# Patient Record
Sex: FEMALE | Race: WHITE | Employment: FULL TIME | URBAN - METROPOLITAN AREA
[De-identification: names, ages, dates, MRNs, and addresses within clinical notes are randomized per-mention and may not be internally consistent; named-entity substitution may affect disease eponyms.]

---

## 2017-02-01 RX ORDER — ATORVASTATIN CALCIUM 40 MG/1
40 TABLET, FILM COATED ORAL DAILY
Qty: 90 TABLET | Refills: 3 | Status: SHIPPED | OUTPATIENT
Start: 2017-02-01 | End: 2018-04-18 | Stop reason: SDUPTHER

## 2017-03-31 ENCOUNTER — OFFICE VISIT (OUTPATIENT)
Dept: CARDIOLOGY CLINIC | Age: 77
End: 2017-03-31

## 2017-03-31 VITALS
SYSTOLIC BLOOD PRESSURE: 150 MMHG | OXYGEN SATURATION: 99 % | WEIGHT: 145 LBS | HEIGHT: 63 IN | DIASTOLIC BLOOD PRESSURE: 60 MMHG | HEART RATE: 73 BPM | BODY MASS INDEX: 25.69 KG/M2

## 2017-03-31 DIAGNOSIS — I48.0 PAROXYSMAL ATRIAL FIBRILLATION (HCC): ICD-10-CM

## 2017-03-31 DIAGNOSIS — E78.00 PURE HYPERCHOLESTEROLEMIA: ICD-10-CM

## 2017-03-31 DIAGNOSIS — I10 ESSENTIAL HYPERTENSION: ICD-10-CM

## 2017-03-31 DIAGNOSIS — Z95.1 S/P CABG X 4: ICD-10-CM

## 2017-03-31 DIAGNOSIS — I25.810 CORONARY ARTERY DISEASE INVOLVING OTHER CORONARY ARTERY BYPASS GRAFT WITHOUT ANGINA PECTORIS: Primary | ICD-10-CM

## 2017-03-31 DIAGNOSIS — I35.0 NONRHEUMATIC AORTIC VALVE STENOSIS: ICD-10-CM

## 2017-03-31 DIAGNOSIS — Z95.2 S/P AVR: ICD-10-CM

## 2017-03-31 PROCEDURE — 99214 OFFICE O/P EST MOD 30 MIN: CPT | Performed by: INTERNAL MEDICINE

## 2017-10-13 ENCOUNTER — OFFICE VISIT (OUTPATIENT)
Dept: CARDIOLOGY CLINIC | Age: 77
End: 2017-10-13

## 2017-10-13 VITALS
HEART RATE: 62 BPM | HEIGHT: 63 IN | SYSTOLIC BLOOD PRESSURE: 106 MMHG | OXYGEN SATURATION: 98 % | WEIGHT: 138.4 LBS | BODY MASS INDEX: 24.52 KG/M2 | DIASTOLIC BLOOD PRESSURE: 58 MMHG

## 2017-10-13 DIAGNOSIS — Z95.1 S/P CABG X 4: Chronic | ICD-10-CM

## 2017-10-13 DIAGNOSIS — E78.00 PURE HYPERCHOLESTEROLEMIA: ICD-10-CM

## 2017-10-13 DIAGNOSIS — I10 ESSENTIAL HYPERTENSION: Chronic | ICD-10-CM

## 2017-10-13 DIAGNOSIS — Z95.2 S/P AVR: Chronic | ICD-10-CM

## 2017-10-13 DIAGNOSIS — I25.10 CORONARY ARTERY DISEASE INVOLVING NATIVE CORONARY ARTERY OF NATIVE HEART WITHOUT ANGINA PECTORIS: Primary | Chronic | ICD-10-CM

## 2017-10-13 PROCEDURE — 99214 OFFICE O/P EST MOD 30 MIN: CPT | Performed by: INTERNAL MEDICINE

## 2017-10-13 NOTE — COMMUNICATION BODY
HPI:  The patient is 69 y.o. female with a past medical history significant for tonsillar cancer, CAD s/p CABG (2013), and AS s/p AVR (4/2016) presents for chronic management of her CAD. She has no specific cardiac complaints today. Her dyspnea on exertion has markedly improved after surgery and has not worsened since. She had been diagnosed with tonsillar cancer and has completed chemotherapy and radiation. She endorses generalized chronic fatigue but no changes. Her dysphagia is chronic but is her biggest complaint. She has lost 40 lbs secondary to her swallowing difficulty/cancer treatment. Patient denies exertional chest pain/pressure, dyspnea at rest, PND, orthopnea, palpitations, lightheadedness, changes in LE edema, and syncope. Patient reports compliance to her medications. Assessment/Plan:     1) CAD s/p CABG x4 in 8/2013. Asymptomatic. Continue with medical management and risk factor modification including aspirin 325 mg daily, statin, and B-blocker. 2) Aortic stenosis s/p AVR (pericardial tissue valve) 7/20/16. Continue ASA. 3) HLD. Continue Atorvastatin 40mg daily. 4) Essential hypertension. Stable and asymptomatic. Goal BP < 140/90 with DM. Continue medical therapy and monitor. Lisinopril was discontinued secondary to hypotension. 5) Post op atrial fibrillation. Currently in regular rhythm per physical examination. Denies palpitations. F/u in clinic in 6 months. Thank you very much for allowing me to participate in the care of your patient. Please do not hesitate to contact me if you have any questions. Sincerely,  Anyi Gutierrez MD  Assessment/Plan:     1) CAD s/p CABG x4 in 8/2013. Asymptomatic. Continue with medical management and risk factor modification including aspirin 325 mg daily, statin, and B-blocker. 2) Aortic stenosis s/p AVR (pericardial tissue valve) 7/20/16. Continue ASA. 3) HLD. Continue Atorvastatin 40mg daily. 4) Essential hypertension. Stable and asymptomatic. Goal BP < 140/90 with DM. Continue medical therapy and monitor. Lisinopril was discontinued secondary to hypotension. 5) Post op atrial fibrillation. Currently in regular rhythm per physical examination. Denies palpitations. F/u in clinic in 6 months. Thank you very much for allowing me to participate in the care of your patient. Please do not hesitate to contact me if you have any questions. Sincerely,  Kathy Reddy.  Faiza Wheat MD
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
## 2017-10-13 NOTE — LETTER
[address] Cardiology - [address] [address]  [address] [facility] [address]   [address] [address]  Phone: [phone]  Fax: [phone]    Ally Murray MD        October 13, 2017     Ian Olea, [address] [id number]    Patient: Vernon Marrero  MR Number: [id number]  YOB: 1940  Date of Visit: 10/13/2017    Dear Dr. Ian Olea:    HPI:  The patient is 69 y.o. female with a past medical history significant for tonsillar cancer, CAD s/p CABG (2013), and AS s/p AVR (4/2016) presents for chronic management of her CAD. She has no specific cardiac complaints today. Her dyspnea on exertion has markedly improved after surgery and has not worsened since. She had been diagnosed with tonsillar cancer and has completed chemotherapy and radiation. She endorses generalized chronic fatigue but no changes. Her dysphagia is chronic but is her biggest complaint. She has lost 40 lbs secondary to her swallowing difficulty/cancer treatment. Patient denies exertional chest pain/pressure, dyspnea at rest, PND, orthopnea, palpitations, lightheadedness, changes in LE edema, and syncope. Patient reports compliance to her medications. Assessment/Plan:     1) CAD s/p CABG x4 in 8/2013. Asymptomatic. Continue with medical management and risk factor modification including aspirin 325 mg daily, statin, and B-blocker. 2) Aortic stenosis s/p AVR (pericardial tissue valve) 7/20/16. Continue ASA. 3) HLD. Continue Atorvastatin 40mg daily. 4) Essential hypertension. Stable and asymptomatic. Goal BP < 140/90 with DM. Continue medical therapy and monitor. Lisinopril was discontinued secondary to hypotension. 5) Post op atrial fibrillation. Currently in regular rhythm per physical examination. Denies palpitations. F/u in clinic in 6 months.      Thank you very much for allowing me to participate in the care of your patient. Please do not hesitate to contact me if you have any questions. Sincerely,  Karlene Jerome. MD Matt      1) CAD s/p CABG x4 in 8/2013. Asymptomatic. Continue with medical management and risk factor modification including aspirin 325 mg daily, statin, and B-blocker. 2) Aortic stenosis s/p AVR (pericardial tissue valve) 7/20/16. Continue ASA. 3) HLD. Continue Atorvastatin 40mg daily. 4) Essential hypertension. Stable and asymptomatic. Goal BP < 140/90 with DM. Continue medical therapy and monitor. Lisinopril was discontinued secondary to hypotension. 5) Post op atrial fibrillation. Currently in regular rhythm per physical examination. Denies palpitations. F/u in clinic in 6 months. Thank you very much for allowing me to participate in the care of your patient. Please do not hesitate to contact me if you have any questions. Sincerely,  Karlene Jerome. Wilver Celaya MD    If you have questions, please do not hesitate to call me. I look forward to following Shirin Estrada along with you.     Sincerely,        Marylin Ruelas MD

## 2018-04-18 RX ORDER — ATORVASTATIN CALCIUM 40 MG/1
TABLET, FILM COATED ORAL
Qty: 90 TABLET | Refills: 3 | Status: SHIPPED | OUTPATIENT
Start: 2018-04-18 | End: 2018-05-11 | Stop reason: SDUPTHER

## 2018-05-11 ENCOUNTER — OFFICE VISIT (OUTPATIENT)
Dept: CARDIOLOGY CLINIC | Age: 78
End: 2018-05-11

## 2018-05-11 VITALS
OXYGEN SATURATION: 97 % | HEIGHT: 64 IN | DIASTOLIC BLOOD PRESSURE: 70 MMHG | SYSTOLIC BLOOD PRESSURE: 120 MMHG | BODY MASS INDEX: 23.93 KG/M2 | HEART RATE: 60 BPM | WEIGHT: 140.2 LBS

## 2018-05-11 DIAGNOSIS — Z95.2 S/P AVR: Chronic | ICD-10-CM

## 2018-05-11 DIAGNOSIS — E78.00 PURE HYPERCHOLESTEROLEMIA: ICD-10-CM

## 2018-05-11 DIAGNOSIS — I10 ESSENTIAL HYPERTENSION: Chronic | ICD-10-CM

## 2018-05-11 DIAGNOSIS — Z95.1 S/P CABG X 4: Chronic | ICD-10-CM

## 2018-05-11 DIAGNOSIS — I25.10 CORONARY ARTERY DISEASE INVOLVING NATIVE CORONARY ARTERY OF NATIVE HEART WITHOUT ANGINA PECTORIS: Primary | Chronic | ICD-10-CM

## 2018-05-11 PROCEDURE — 99214 OFFICE O/P EST MOD 30 MIN: CPT | Performed by: INTERNAL MEDICINE

## 2018-05-11 RX ORDER — MULTIVIT WITH MINERALS/LUTEIN
250 TABLET ORAL DAILY
COMMUNITY
End: 2020-01-17

## 2018-05-11 RX ORDER — LEVOTHYROXINE SODIUM 0.05 MG/1
50 TABLET ORAL DAILY
COMMUNITY

## 2018-05-11 RX ORDER — ATORVASTATIN CALCIUM 40 MG/1
TABLET, FILM COATED ORAL
Qty: 90 TABLET | Refills: 3 | Status: SHIPPED | OUTPATIENT
Start: 2018-05-11 | End: 2018-11-19 | Stop reason: SDUPTHER

## 2018-10-12 ENCOUNTER — TELEPHONE (OUTPATIENT)
Dept: CARDIOLOGY CLINIC | Age: 78
End: 2018-10-12
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
## 2018-11-16 NOTE — PROGRESS NOTES
the note above accurately reflects all work, treatment, procedures, and medical decision making performed by me.

## 2018-11-19 ENCOUNTER — OFFICE VISIT (OUTPATIENT)
Dept: CARDIOLOGY CLINIC | Age: 78
End: 2018-11-19
Payer: MEDICARE

## 2018-11-19 VITALS
HEIGHT: 63 IN | WEIGHT: 131 LBS | DIASTOLIC BLOOD PRESSURE: 76 MMHG | SYSTOLIC BLOOD PRESSURE: 118 MMHG | HEART RATE: 66 BPM | BODY MASS INDEX: 23.21 KG/M2 | OXYGEN SATURATION: 98 %

## 2018-11-19 DIAGNOSIS — I10 ESSENTIAL HYPERTENSION: Chronic | ICD-10-CM

## 2018-11-19 DIAGNOSIS — Z95.1 S/P CABG X 4: Chronic | ICD-10-CM

## 2018-11-19 DIAGNOSIS — E78.00 PURE HYPERCHOLESTEROLEMIA: ICD-10-CM

## 2018-11-19 DIAGNOSIS — Z95.2 S/P AVR: ICD-10-CM

## 2018-11-19 DIAGNOSIS — I25.10 CORONARY ARTERY DISEASE INVOLVING NATIVE CORONARY ARTERY OF NATIVE HEART WITHOUT ANGINA PECTORIS: Primary | Chronic | ICD-10-CM

## 2018-11-19 PROCEDURE — 99214 OFFICE O/P EST MOD 30 MIN: CPT | Performed by: INTERNAL MEDICINE

## 2018-11-19 RX ORDER — ATORVASTATIN CALCIUM 40 MG/1
TABLET, FILM COATED ORAL
Qty: 90 TABLET | Refills: 3 | Status: SHIPPED | OUTPATIENT
Start: 2018-11-19 | End: 2019-12-23

## 2018-11-19 NOTE — LETTER
This note was scribed in the presence of the physician by Donna Erazo RN. Physician Attestation: The scribes documentation has been prepared under my direction and personally reviewed by me in its entirety. I confirm that the note above accurately reflects all work, treatment, procedures, and medical decision making performed by me. If you have questions, please do not hesitate to call me. I look forward to following Frieda Brar along with you.     Sincerely,        Gurjit Whitaker MD

## 2019-12-23 RX ORDER — ATORVASTATIN CALCIUM 40 MG/1
TABLET, FILM COATED ORAL
Qty: 90 TABLET | Refills: 0 | Status: SHIPPED | OUTPATIENT
Start: 2019-12-23 | End: 2020-04-07 | Stop reason: SDUPTHER

## 2020-01-16 NOTE — PROGRESS NOTES
Carpal tunnel release 3 x on left ; 4 x on right side     HYSTERECTOMY      Partial-uterus removed    OTHER SURGICAL HISTORY Bilateral     Bilateral thumb tendon release     SPINE SURGERY  2014    c5 and c7 - bone donor/plating/screws    TONSILLECTOMY      as a child      Family History   Problem Relation Age of Onset    Alzheimer's Disease Mother     Diabetes Mother         type 2 later in life    Heart Disease Father         MI     Diabetes Father         type 2 later in life    Heart Attack Paternal Grandfather     Diabetes Maternal Grandfather     Other Paternal Grandmother         Brain aneurysm     Anesth Problems Neg Hx     Malig Hyperten Neg Hx     Hypotension Neg Hx     Malig Hypertherm Neg Hx     Pseudochol. Deficiency Neg Hx      Social History     Tobacco Use    Smoking status: Never Smoker    Smokeless tobacco: Never Used   Substance Use Topics    Alcohol use: No     Alcohol/week: 0.0 standard drinks    Drug use: No     Comment: never     No Known Allergies  Current Outpatient Medications   Medication Sig Dispense Refill    Cholecalciferol (VITAMIN D3) 50 MCG (2000 UT) CAPS Take by mouth      atorvastatin (LIPITOR) 40 MG tablet TAKE 1 TABLET BY MOUTH EVERY DAY 90 tablet 0    levothyroxine (SYNTHROID) 50 MCG tablet Take 50 mcg by mouth Daily      docusate sodium (COLACE) 100 MG capsule Take 1 capsule by mouth 2 times daily as needed for Constipation 60 capsule 3    Multiple Vitamins-Minerals (CENTRUM SILVER) TABS Take  by mouth daily.  aspirin 325 MG EC tablet Take 1 tablet by mouth daily. 30 tablet 5    omeprazole (PRILOSEC) 20 MG capsule Take 20 mg by mouth 2 times daily        No current facility-administered medications for this visit. Review of Systems:  · Constitutional: No unanticipated weight loss. There's been no change in energy level, sleep pattern, or activity level. No fevers, chills. · Eyes: No visual changes or diplopia. No scleral icterus.   · ENT: No

## 2020-01-17 ENCOUNTER — OFFICE VISIT (OUTPATIENT)
Dept: CARDIOLOGY CLINIC | Age: 80
End: 2020-01-17
Payer: MEDICARE

## 2020-01-17 VITALS
SYSTOLIC BLOOD PRESSURE: 120 MMHG | HEIGHT: 62 IN | DIASTOLIC BLOOD PRESSURE: 62 MMHG | WEIGHT: 125 LBS | OXYGEN SATURATION: 99 % | BODY MASS INDEX: 23 KG/M2 | HEART RATE: 58 BPM

## 2020-01-17 PROCEDURE — 99214 OFFICE O/P EST MOD 30 MIN: CPT | Performed by: INTERNAL MEDICINE

## 2020-01-17 PROCEDURE — 93000 ELECTROCARDIOGRAM COMPLETE: CPT | Performed by: INTERNAL MEDICINE

## 2020-01-17 RX ORDER — ACETAMINOPHEN 160 MG
TABLET,DISINTEGRATING ORAL
COMMUNITY

## 2020-01-17 NOTE — PATIENT INSTRUCTIONS
for good. How is high cholesterol treated? The goal of treatment is to reduce your chances of having a heart attack or stroke. The goal is not to lower your cholesterol numbers only. · You may make lifestyle changes, such as eating healthy foods, not smoking, losing weight, and being more active. · You may have to take medicine. Follow-up care is a key part of your treatment and safety. Be sure to make and go to all appointments, and call your doctor if you are having problems. It's also a good idea to know your test results and keep a list of the medicines you take. Where can you learn more? Go to https://[employer]iajalaeweb.[email or web address]. org and sign in to your [employer] account. Enter I192 in the [employer] box to learn more about \"Learning About High Cholesterol. \"     If you do not have an account, please click on the \"Sign Up Now\" link. Current as of: April 9, 2019  Content Version: 12.3  © 7681-3358 Healthwise, Incorporated. Care instructions adapted under license by [facility] ([facility]). If you have questions about a medical condition or this instruction, always ask your healthcare professional. [address] any warranty or liability for your use of this information.

## 2020-04-07 RX ORDER — ATORVASTATIN CALCIUM 40 MG/1
TABLET, FILM COATED ORAL
Qty: 90 TABLET | Refills: 1 | Status: SHIPPED | OUTPATIENT
Start: 2020-04-07 | End: 2020-10-23 | Stop reason: SDUPTHER

## 2020-10-23 RX ORDER — ATORVASTATIN CALCIUM 40 MG/1
TABLET, FILM COATED ORAL
Qty: 90 TABLET | Refills: 3 | Status: SHIPPED | OUTPATIENT
Start: 2020-10-23 | End: 2021-01-27 | Stop reason: SDUPTHER

## 2021-01-26 NOTE — PATIENT INSTRUCTIONS
Patient Education        Aortic Valve Stenosis: Care Instructions  Your Care Instructions     Having aortic valve stenosis means that the valve between your heart and the large blood vessel that carries blood to the body (aorta) has narrowed. That forces the heart to pump harder to get enough blood through the valve. As stenosis gets worse, the valve gets narrower. This can cause symptoms. Symptoms include chest pain, dizziness, fainting, or shortness of breath. If you have mild or moderate stenosis and don't have symptoms, you may not need treatment now. Your doctor will check your heart regularly. You may need treatment if the stenosis gets worse. With severe stenosis, you may need to have the valve replaced. Follow-up care is a key part of your treatment and safety. Be sure to make and go to all appointments, and call your doctor if you are having problems. It's also a good idea to know your test results and keep a list of the medicines you take. How can you care for yourself at home? · Be safe with medicines. Take your medicines exactly as prescribed. Call your doctor if you think you are having a problem with your medicine. You will get more details on the specific medicines your doctor prescribes. · Eat a heart-healthy diet. Limit how much sodium you eat. · Be active. Ask your doctor what type and level of exercise is safe for you. · Do not smoke. Smoking can make aortic valve stenosis worse. If you need help quitting, talk to your doctor about stop-smoking programs and medicines. · Stay at a healthy weight. Lose weight if you need to. · Avoid colds and flu. Get a pneumococcal vaccine shot. If you have had one before, ask your doctor if you need another dose. Get a flu vaccine every year. · Take care of your teeth and gums. Get regular dental checkups. Good dental health is important because bacteria can spread from infected teeth and gums to the heart valves. When should you call for help?    Call 911 anytime you think you may need emergency care. For example, call if:    · You passed out (lost consciousness).     · You have symptoms of a heart attack. These may include:  ? Chest pain or pressure, or a strange feeling in the chest.  ? Sweating. ? Shortness of breath. ? Nausea or vomiting. ? Pain, pressure, or a strange feeling in the back, neck, jaw, or upper belly or in one or both shoulders or arms. ? Lightheadedness or sudden weakness. ? A fast or irregular heartbeat. After you call 911, the  may tell you to chew 1 adult-strength or 2 to 4 low-dose aspirin. Wait for an ambulance. Do not try to drive yourself. Call your doctor now or seek immediate medical care if:    · You develop new symptoms of aortic valve stenosis, such as chest pain, dizziness, or shortness of breath.     · You have new or increased shortness of breath.     · You are dizzy or lightheaded, or you feel like you may faint.     · You have sudden weight gain, such as more than 2 to 3 pounds in a day or 5 pounds in a week. (Your doctor may suggest a different range of weight gain.)     · You have increased swelling in your legs, ankles, or feet. Watch closely for changes in your health, and be sure to contact your doctor if:    · You have trouble making healthy lifestyle changes.     · You want more information about healthy lifestyle changes. Where can you learn more? Go to https://[email or web address].Kleermail. org and sign in to your [facility] account. Enter O578 in the [facility] box to learn more about \"Aortic Valve Stenosis: Care Instructions. \"     If you do not have an account, please click on the \"Sign Up Now\" link. Current as of: December 16, 2019               Content Version: 12.6  © 7576-0347 [employer]. Care instructions adapted under license by [facility] ([facility]).  If you have questions about a medical condition or this instruction, always ask your healthcare professional. Syed
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
## 2021-01-27 ENCOUNTER — OFFICE VISIT (OUTPATIENT)
Dept: CARDIOLOGY CLINIC | Age: 81
End: 2021-01-27
Payer: MEDICARE

## 2021-01-27 VITALS
OXYGEN SATURATION: 99 % | HEART RATE: 49 BPM | HEIGHT: 62 IN | WEIGHT: 120.8 LBS | BODY MASS INDEX: 22.23 KG/M2 | TEMPERATURE: 97.7 F | DIASTOLIC BLOOD PRESSURE: 60 MMHG | SYSTOLIC BLOOD PRESSURE: 116 MMHG

## 2021-01-27 DIAGNOSIS — I35.0 NONRHEUMATIC AORTIC VALVE STENOSIS: ICD-10-CM

## 2021-01-27 DIAGNOSIS — I25.10 CORONARY ARTERY DISEASE INVOLVING NATIVE CORONARY ARTERY OF NATIVE HEART WITHOUT ANGINA PECTORIS: Primary | ICD-10-CM

## 2021-01-27 DIAGNOSIS — I10 ESSENTIAL HYPERTENSION: ICD-10-CM

## 2021-01-27 DIAGNOSIS — Z95.1 S/P CABG X 4: ICD-10-CM

## 2021-01-27 DIAGNOSIS — Z95.2 S/P AVR: ICD-10-CM

## 2021-01-27 DIAGNOSIS — E78.2 MIXED HYPERLIPIDEMIA: ICD-10-CM

## 2021-01-27 PROCEDURE — 93000 ELECTROCARDIOGRAM COMPLETE: CPT | Performed by: INTERNAL MEDICINE

## 2021-01-27 PROCEDURE — 99214 OFFICE O/P EST MOD 30 MIN: CPT | Performed by: INTERNAL MEDICINE

## 2021-01-27 RX ORDER — ATORVASTATIN CALCIUM 40 MG/1
TABLET, FILM COATED ORAL
Qty: 90 TABLET | Refills: 3 | Status: SHIPPED | OUTPATIENT
Start: 2021-01-27